# Patient Record
Sex: FEMALE | Race: AMERICAN INDIAN OR ALASKA NATIVE
[De-identification: names, ages, dates, MRNs, and addresses within clinical notes are randomized per-mention and may not be internally consistent; named-entity substitution may affect disease eponyms.]

---

## 2018-12-31 ENCOUNTER — HOSPITAL ENCOUNTER (OUTPATIENT)
Dept: HOSPITAL 5 - GIO | Age: 48
Discharge: HOME | End: 2018-12-31
Attending: INTERNAL MEDICINE
Payer: MEDICAID

## 2018-12-31 VITALS — SYSTOLIC BLOOD PRESSURE: 115 MMHG | DIASTOLIC BLOOD PRESSURE: 82 MMHG

## 2018-12-31 DIAGNOSIS — Z87.891: ICD-10-CM

## 2018-12-31 DIAGNOSIS — K21.9: ICD-10-CM

## 2018-12-31 DIAGNOSIS — Z79.899: ICD-10-CM

## 2018-12-31 DIAGNOSIS — R10.84: ICD-10-CM

## 2018-12-31 DIAGNOSIS — R63.4: ICD-10-CM

## 2018-12-31 DIAGNOSIS — Z98.51: ICD-10-CM

## 2018-12-31 DIAGNOSIS — E78.00: ICD-10-CM

## 2018-12-31 DIAGNOSIS — J45.909: ICD-10-CM

## 2018-12-31 DIAGNOSIS — K59.00: ICD-10-CM

## 2018-12-31 DIAGNOSIS — E11.9: ICD-10-CM

## 2018-12-31 DIAGNOSIS — I10: ICD-10-CM

## 2018-12-31 DIAGNOSIS — K64.8: Primary | ICD-10-CM

## 2018-12-31 DIAGNOSIS — Z98.890: ICD-10-CM

## 2018-12-31 DIAGNOSIS — Z79.01: ICD-10-CM

## 2018-12-31 PROCEDURE — 82962 GLUCOSE BLOOD TEST: CPT

## 2018-12-31 PROCEDURE — 45378 DIAGNOSTIC COLONOSCOPY: CPT

## 2018-12-31 PROCEDURE — 81025 URINE PREGNANCY TEST: CPT

## 2018-12-31 PROCEDURE — 96374 THER/PROPH/DIAG INJ IV PUSH: CPT

## 2018-12-31 NOTE — OPERATIVE REPORT
Operative Report


Operative Report: 


Date of procedure: 12/31/2018





Procedure: Colonoscopy.





Attending physician: Ernie Baldwin MD





: Ernie Baldwin MD





Indication: Patient is a 48-year-old female who presents for colonoscopy to 

evaluate abdominal pain and constipation. A colonoscopy serves to evaluate 

patient so that treatment may be directed based on the findings.





Consent: Informed consent was obtained after advising the patient and family 

regarding nature of this procedure, its indications, potential benefits as well 

as possible complications including but not limited to bleeding perforation and 

adverse reaction to medication, infection as well as other cardiopulmonary 

complications.  An informed written and verbal consent was then obtained after 

due opportunity was provided for questions and answers.





Monitoring: Patient was monitored continuously with pulse oximetry and 

electrocardiographic recordings as well as blood pressure recordings.  Vital 

signs remained stable throughout this procedure with no untoward events.





Preoperative assessment: Patient was assessed immediately prior to this 

procedure for capacity to tolerate monitored anesthesia care and moderate 

sedation as well as general anesthesia.  Patient's ASA classification is 2, 

Mallampati class is 2, Hyomental distance is 3.





Instrument: [employer] video colonoscope





Medications: Propofol given intravenously in divided doses.  For details please 

refer to anesthesia records.





Description of procedure: Patient was placed in the left lateral decubitus 

position after achieving sedation, a digital rectal examination was performed 

following which the colonoscope was introduced into the anal verge and advanced 

to the cecum which was identified by the ileocecal valve, the appendiceal 

orifice, as well as by the cecal strap and direct transillumination.  The 

colonoscope was subsequently withdrawn with careful inspection of all mucosal 

surfaces.  Patient tolerated this procedure well and was subsequently taken to 

the recovery room.  The following findings were noted.





Findings: The entirety of the colon to the cecum was normal.  On the retroflex 

view at the anal verge, patient had internal hemorrhoids and hypertrophied anal 

papilla.





Impression: Normal colonoscopy.


Hypertrophied anal papilla


Internal hemorrhoids.





Plan: High-fiber diet.


Prn stool softeners.


Repeat colonoscopy in 10 years.

## 2018-12-31 NOTE — ANESTHESIA DAY OF SURGERY
Anesthesia Day of Surgery





- Day of Surgery


Patient Examined: Yes


Patient H&P Reviewed: Yes


Patient is NPO: Yes


Beta Blockers: No


Cardiac Clearance: No


Pulmonary Clearance: No

## 2018-12-31 NOTE — DISCHARGE SUMMARY
Short Stay Discharge Plan


Activity: advance as tolerated


Weight Bearing Status: Weight Bear as Tolerated


Diet: regular


Follow up with: 


PRIMARY CARE,MD [Primary Care Provider] - 7 Days

## 2018-12-31 NOTE — ANESTHESIA CONSULTATION
Anesthesia Consult and Med Hx


Date of service: 12/31/18





- Airway


Anesthetic Teeth Evaluation: Poor


ROM Head & Neck: Adequate


Mental/Hyoid Distance: Adequate (broken)





- Pulmonary Exam


CTA: Yes





- Cardiac Exam


Cardiac Exam: No Murmur





- Pre-Operative Health Status


ASA Pre-Surgery Classification: ASA3


Proposed Anesthetic Plan: MAC





- Pulmonary


Hx Smoking: Yes


Hx Asthma: Yes





- Cardiovascular System


Hx Hypertension: Yes





- Endocrine


Hx Insulin Dependent Diabetes: Yes

## 2022-06-28 ENCOUNTER — HOSPITAL ENCOUNTER (EMERGENCY)
Dept: HOSPITAL 5 - ED | Age: 52
LOS: 1 days | Discharge: HOME | End: 2022-06-29
Payer: MEDICAID

## 2022-06-28 DIAGNOSIS — I10: ICD-10-CM

## 2022-06-28 DIAGNOSIS — Z87.891: ICD-10-CM

## 2022-06-28 DIAGNOSIS — E11.65: Primary | ICD-10-CM

## 2022-06-28 DIAGNOSIS — J45.909: ICD-10-CM

## 2022-06-28 DIAGNOSIS — K21.9: ICD-10-CM

## 2022-06-28 PROCEDURE — 96361 HYDRATE IV INFUSION ADD-ON: CPT

## 2022-06-28 PROCEDURE — 85610 PROTHROMBIN TIME: CPT

## 2022-06-28 PROCEDURE — 82805 BLOOD GASES W/O2 SATURATION: CPT

## 2022-06-28 PROCEDURE — 36415 COLL VENOUS BLD VENIPUNCTURE: CPT

## 2022-06-28 PROCEDURE — 82553 CREATINE MB FRACTION: CPT

## 2022-06-28 PROCEDURE — G0480 DRUG TEST DEF 1-7 CLASSES: HCPCS

## 2022-06-28 PROCEDURE — 81001 URINALYSIS AUTO W/SCOPE: CPT

## 2022-06-28 PROCEDURE — 99284 EMERGENCY DEPT VISIT MOD MDM: CPT

## 2022-06-28 PROCEDURE — 80307 DRUG TEST PRSMV CHEM ANLYZR: CPT

## 2022-06-28 PROCEDURE — 96374 THER/PROPH/DIAG INJ IV PUSH: CPT

## 2022-06-28 PROCEDURE — 96375 TX/PRO/DX INJ NEW DRUG ADDON: CPT

## 2022-06-28 PROCEDURE — 84484 ASSAY OF TROPONIN QUANT: CPT

## 2022-06-28 PROCEDURE — 80053 COMPREHEN METABOLIC PANEL: CPT

## 2022-06-28 PROCEDURE — 80320 DRUG SCREEN QUANTALCOHOLS: CPT

## 2022-06-28 PROCEDURE — 96376 TX/PRO/DX INJ SAME DRUG ADON: CPT

## 2022-06-28 PROCEDURE — 82010 KETONE BODYS QUAN: CPT

## 2022-06-28 PROCEDURE — 85025 COMPLETE CBC W/AUTO DIFF WBC: CPT

## 2022-06-28 PROCEDURE — 82550 ASSAY OF CK (CPK): CPT

## 2022-06-29 VITALS — SYSTOLIC BLOOD PRESSURE: 151 MMHG | DIASTOLIC BLOOD PRESSURE: 106 MMHG

## 2022-06-29 LAB
ALBUMIN SERPL-MCNC: 4.6 G/DL (ref 3.9–5)
ALT SERPL-CCNC: 110 UNITS/L (ref 7–56)
BASOPHILS # (AUTO): 0 K/MM3 (ref 0–0.1)
BASOPHILS NFR BLD AUTO: 0.6 % (ref 0–1.8)
BILIRUB UR QL STRIP: (no result)
BLOOD UR QL VISUAL: (no result)
BUN SERPL-MCNC: 18 MG/DL (ref 7–17)
BUN/CREAT SERPL: 20 %
CALCIUM SERPL-MCNC: 10 MG/DL (ref 8.4–10.2)
EOSINOPHIL # BLD AUTO: 0 K/MM3 (ref 0–0.4)
EOSINOPHIL NFR BLD AUTO: 0.2 % (ref 0–4.3)
HCT VFR BLD CALC: 48.6 % (ref 30.3–42.9)
HEMOLYSIS INDEX: 22
HGB BLD-MCNC: 16.6 GM/DL (ref 10.1–14.3)
INR PPP: 0.84 (ref 0.87–1.13)
LYMPHOCYTES # BLD AUTO: 1.7 K/MM3 (ref 1.2–5.4)
LYMPHOCYTES NFR BLD AUTO: 43.1 % (ref 13.4–35)
MCHC RBC AUTO-ENTMCNC: 34 % (ref 30–34)
MCV RBC AUTO: 98 FL (ref 79–97)
MONOCYTES # (AUTO): 0.2 K/MM3 (ref 0–0.8)
MONOCYTES % (AUTO): 6 % (ref 0–7.3)
MUCOUS THREADS #/AREA URNS HPF: (no result) /HPF
PH UR STRIP: 6 [PH] (ref 5–7)
PLATELET # BLD: 179 K/MM3 (ref 140–440)
PROT UR STRIP-MCNC: (no result) MG/DL
RBC # BLD AUTO: 4.97 M/MM3 (ref 3.65–5.03)
RBC #/AREA URNS HPF: < 1 /HPF (ref 0–6)
UROBILINOGEN UR-MCNC: < 2 MG/DL (ref ?–2)
WBC #/AREA URNS HPF: 1 /HPF (ref 0–6)

## 2022-06-29 NOTE — EMERGENCY DEPARTMENT REPORT
ED General Adult HPI





- General


Chief complaint: Hyperglycemia


Stated complaint: HTN/HIGH BLOOD SUGAR


Time Seen by Provider: 06/29/22 01:14


Source: patient


Mode of arrival: Ambulatory


Limitations: No Limitations





- History of Present Illness


Initial comments: 





HTN and High blood sugar bg 507 with EMS


-: Gradual, hour(s)


Worsens with: none


Associated Symptoms: denies: denies other symptoms, confusion, chest pain, 

diaphoresis





- Related Data


                                Home Medications











 Medication  Instructions  Recorded  Confirmed  Last Taken


 


Adult Low Dose Aspirin EC 81 mg PO DAILY 12/31/18 12/31/18 12/31/18


 


Atrovent Hfa 2 puff INHALATION DAILY 12/31/18 12/31/18 12/31/18


 


Docusate Calcium 100 mg PO DAILY 12/31/18 12/31/18 12/30/18


 


Humalog 28 units SUB-Q TID 12/31/18 12/31/18 12/30/18


 


Hydrazine Sulfate 50 mg PO DAILY 12/31/18 12/31/18 12/31/18


 


Hydrochlorothiazide 25 mg PO DAILY 12/31/18 12/31/18 12/31/18


 


Lactulose [Cephulac] 30 ml PO BID 12/31/18 12/31/18 12/30/18


 


Levemir VIAL 58 units SUB-Q HS 12/31/18 12/31/18 12/29/18


 


Lyrica 50 mg PO DAILY 12/31/18 12/31/18 12/31/18


 


Metoprolol 50 mg PO DAILY 12/31/18 12/31/18 12/31/18


 


Mirtazapine 30 mg PO DAILY 12/31/18 12/31/18 12/31/18


 


Omeprazole 20 mg PO DAILY 12/31/18 12/31/18 12/31/18


 


Proventil Hfa 2 puff INHALATION PRN PRN 12/31/18 12/31/18 12/24/18


 


amLODIPine 10 mg PO DAILY 12/31/18 12/31/18 12/31/18


 


traZODone 50 mg PO DAILY 12/31/18 12/31/18 12/31/18











                                    Allergies











Allergy/AdvReac Type Severity Reaction Status Date / Time


 


No Known Allergies Allergy   Verified 12/31/18 07:49














ED Review of Systems


ROS: 


Stated complaint: HTN/HIGH BLOOD SUGAR


Other details as noted in HPI





Constitutional: denies: chills, fever


Eyes: denies: eye pain, eye discharge, vision change


ENT: denies: ear pain, throat pain


Respiratory: denies: cough, shortness of breath, wheezing


Cardiovascular: denies: chest pain, palpitations


Endocrine: no symptoms reported


Gastrointestinal: denies: abdominal pain, nausea, diarrhea


Genitourinary: denies: urgency, dysuria, discharge


Musculoskeletal: denies: back pain, joint swelling, arthralgia


Skin: denies: rash, lesions


Neurological: denies: headache, weakness, paresthesias


Psychiatric: denies: anxiety, depression


Hematological/Lymphatic: denies: easy bleeding, easy bruising





ED Past Medical Hx





- Past Medical History


Hx Hypertension: Yes


Hx Diabetes: Yes


Hx GERD: Yes


Hx Asthma: Yes





- Social History


Smoking Status: Former Smoker





- Medications


Home Medications: 


                                Home Medications











 Medication  Instructions  Recorded  Confirmed  Last Taken  Type


 


Adult Low Dose Aspirin EC 81 mg PO DAILY 12/31/18 12/31/18 12/31/18 History


 


Atrovent Hfa 2 puff INHALATION DAILY 12/31/18 12/31/18 12/31/18 History


 


Docusate Calcium 100 mg PO DAILY 12/31/18 12/31/18 12/30/18 History


 


Humalog 28 units SUB-Q TID 12/31/18 12/31/18 12/30/18 History


 


Hydrazine Sulfate 50 mg PO DAILY 12/31/18 12/31/18 12/31/18 History


 


Hydrochlorothiazide 25 mg PO DAILY 12/31/18 12/31/18 12/31/18 History


 


Lactulose [Cephulac] 30 ml PO BID 12/31/18 12/31/18 12/30/18 History


 


Levemir VIAL 58 units SUB-Q HS 12/31/18 12/31/18 12/29/18 History


 


Lyrica 50 mg PO DAILY 12/31/18 12/31/18 12/31/18 History


 


Metoprolol 50 mg PO DAILY 12/31/18 12/31/18 12/31/18 History


 


Mirtazapine 30 mg PO DAILY 12/31/18 12/31/18 12/31/18 History


 


Omeprazole 20 mg PO DAILY 12/31/18 12/31/18 12/31/18 History


 


Proventil Hfa 2 puff INHALATION PRN PRN 12/31/18 12/31/18 12/24/18 History


 


amLODIPine 10 mg PO DAILY 12/31/18 12/31/18 12/31/18 History


 


traZODone 50 mg PO DAILY 12/31/18 12/31/18 12/31/18 History














ED Physical Exam





- General


Limitations: No Limitations


General appearance: alert, cachectic





- Head


Head exam: Present: atraumatic, normocephalic





- Eye


Eye exam: Present: normal appearance





- ENT


ENT exam: Present: mucous membranes moist





- Neck


Neck exam: Present: normal inspection





- Respiratory


Respiratory exam: Present: normal lung sounds bilaterally.  Absent: respiratory 

distress





- Cardiovascular


Cardiovascular Exam: Present: regular rate, normal rhythm.  Absent: systolic 

murmur, diastolic murmur, rubs, gallop





- GI/Abdominal


GI/Abdominal exam: Present: soft, normal bowel sounds





- Extremities Exam


Extremities exam: Present: normal inspection





- Back Exam


Back exam: Present: normal inspection





- Neurological Exam


Neurological exam: Present: alert, oriented X3





- Psychiatric


Psychiatric exam: Present: normal affect, normal mood





- Skin


Skin exam: Present: warm, dry, intact, normal color.  Absent: rash





ED Course





                                   Vital Signs











  06/29/22 06/29/22 06/29/22





  00:52 02:24 04:20


 


Temperature 97.8 F  


 


Pulse Rate 84 85 


 


Respiratory 16  





Rate   


 


Blood Pressure  202/116 


 


Blood Pressure 180/110  





[Right]   


 


O2 Sat by Pulse 97  99





Oximetry   














  06/29/22





  04:22


 


Temperature 


 


Pulse Rate 74


 


Respiratory 20





Rate 


 


Blood Pressure 


 


Blood Pressure 166/110





[Right] 


 


O2 Sat by Pulse 99





Oximetry 














ED Medical Decision Making





- Lab Data


Result diagrams: 


                                 06/29/22 01:29





                                 06/29/22 01:29





- Radiology Data


Radiology results: report reviewed, image reviewed





- Medical Decision Making





work up shwoed hyperglycemia , no ketosis or gap, insulin given 20 units with 3 

litres bs is down to 400s , BP controlled with lopressor 5 


Critical care attestation.: 


If time is entered above; I have spent that time in minutes in the direct care 

of this critically ill patient, excluding procedure time.








ED Disposition


Clinical Impression: 


 Hyperglycemia, Uncontrolled hypertension





Disposition: 01 HOME / SELF CARE / HOMELESS


Is pt being admited?: No


Does the pt Need Aspirin: No


Condition: Stable


Instructions:  Hypertension (ED)


Referrals: 


ROSE MORSE MD [Primary Care Provider] - 3-5 Days